# Patient Record
Sex: FEMALE | Race: WHITE | NOT HISPANIC OR LATINO
[De-identification: names, ages, dates, MRNs, and addresses within clinical notes are randomized per-mention and may not be internally consistent; named-entity substitution may affect disease eponyms.]

---

## 2020-06-05 PROBLEM — Z00.00 ENCOUNTER FOR PREVENTIVE HEALTH EXAMINATION: Status: ACTIVE | Noted: 2020-06-05

## 2020-06-11 ENCOUNTER — APPOINTMENT (OUTPATIENT)
Dept: VASCULAR SURGERY | Facility: CLINIC | Age: 45
End: 2020-06-11
Payer: COMMERCIAL

## 2020-06-11 VITALS
BODY MASS INDEX: 17.67 KG/M2 | WEIGHT: 90 LBS | DIASTOLIC BLOOD PRESSURE: 80 MMHG | SYSTOLIC BLOOD PRESSURE: 120 MMHG | HEIGHT: 60 IN

## 2020-06-11 DIAGNOSIS — J45.909 UNSPECIFIED ASTHMA, UNCOMPLICATED: ICD-10-CM

## 2020-06-11 DIAGNOSIS — F17.200 NICOTINE DEPENDENCE, UNSPECIFIED, UNCOMPLICATED: ICD-10-CM

## 2020-06-11 DIAGNOSIS — M79.89 OTHER SPECIFIED SOFT TISSUE DISORDERS: ICD-10-CM

## 2020-06-11 PROCEDURE — 99203 OFFICE O/P NEW LOW 30 MIN: CPT

## 2020-06-11 RX ORDER — ALBUTEROL SULFATE 90 UG/1
INHALANT RESPIRATORY (INHALATION)
Refills: 0 | Status: ACTIVE | COMMUNITY

## 2020-06-11 NOTE — ASSESSMENT
[FreeTextEntry1] : 43 y/o female with h/o lower back disc herniation presents for evaluation of leg swelling bilaterally for the past one year, denies any h/o DVT, had varicose veins removed from right calf several years ago. Swelling worse at the end of the day. She used to be a pharmacist, has used compression stockings with some improvement. \par She does not have significant lower extremity edema or varicosities on physical exam, pulses are palpable throughout the lower extremities bilaterally.\par I recommend compression stockings as tolerated, no vascular surgical intervention is needed at this time.

## 2020-06-11 NOTE — HISTORY OF PRESENT ILLNESS
[FreeTextEntry1] : 45 y/o female with h/o lower back disc herniation presents for evaluation of leg swelling bilaterally for the past one year, denies any h/o DVT, had varicose veins removed from right calf several years ago. Swelling worse at the end of the day.

## 2023-04-27 ENCOUNTER — APPOINTMENT (OUTPATIENT)
Dept: PAIN MANAGEMENT | Facility: CLINIC | Age: 48
End: 2023-04-27
Payer: COMMERCIAL

## 2023-04-27 VITALS — WEIGHT: 90 LBS | BODY MASS INDEX: 17.67 KG/M2 | HEIGHT: 60 IN

## 2023-04-27 DIAGNOSIS — M54.16 RADICULOPATHY, LUMBAR REGION: ICD-10-CM

## 2023-04-27 PROCEDURE — 96136 PSYCL/NRPSYC TST PHY/QHP 1ST: CPT | Mod: 59

## 2023-04-27 PROCEDURE — 99204 OFFICE O/P NEW MOD 45 MIN: CPT | Mod: 25

## 2023-04-27 NOTE — PHYSICAL EXAM
[de-identified] : BACK - tenderness into the lumbar paraspinals. ROM restricted. Pain with flexion. Positive SLR bilaterally.

## 2023-04-27 NOTE — DATA REVIEWED
[FreeTextEntry1] : MRI of the lumbar spine taken on 04/08/2022 showed degenerative disc disease, disc bulge, and borderline stenosis L4-5 and L5-S1.  Left foraminal disc at L4-5 contributing to nerve root impingement of the exiting left-sided nerve root.  Otherwise no significant abnormalities.\par \par SOAPP: Scored a 0 , low risk.\par  \par [place] REGISTRY: Reviewed .  \par  \par UDS: No data obtained today. \par   \par Medications that trigger a UDS: Benzodiazepines (Ativan, Xanax, Valium) etc, Barbiturates, Narcotics (Avinza, Butrans, hydrocodone, Codeine, Estee, Methadone, Morphine, MS Contin, Opana, oxycodone, Oxycontin, Suboxone etc), Pregabalin (Lyrica), Tramadol (Ultacet, Utram etc), Tapentadol, (Nucynta) and Elist Drugs (cocaine, THC, Etc.)\par  \par Risk factors: Bipolar Illness, positive for any an illicit drugs, history of any ETOH and drug abuse, any signs of diversion, Sharing Meds, selling meds. Non consistent [place] drug reporting and above 120meq of morphine\par  \par Low risk: Patient has combination of a low risk SOAP and no risk factors. UDS would be repeated randomly every quarter

## 2023-04-27 NOTE — ASSESSMENT
[FreeTextEntry1] : 47 year old female presenting with ongoing lumbar radiculopathy. Patient is presenting with radicular pain with impairment in ADLs and functionality.  The pain has not responded to conservative care, including medications, stretching, as well as active modalities, such as physical therapy.  Imaging studies as well as physical exam findings corroborate the symptomatology and radicular pain.  We will proceed with an epidural at this point. Follow up in 6 weeks will be made for reassessment. I have explained the findings to the patient and all questions have been answered. \par \par Bilateral L4-5 transforaminal epidural steroid injection with sedation.\par \par Patient had a MRI that shows a radicular component along with pain referred into the lower extremity. Patient has trialed rehab (Home exercise, physical therapy or chiropractic care) and medications I will schedule a L4-5 SNRI. \par \par Risk, benefits, pros and cons of procedure were explained to the patient using models and diagrams and their questions were answered. \par \par \par The patient has severe anxiety of procedures that necessitates monitored anesthesia care (MAC). The procedure performed will be close to major nerves, arteries, and spinal cord and/or joint structures. Due to the proximity of these structures, we need the patient to be still during the procedure.  With the help of MAC, this will be safely achieved and decrease the risk of any complications.\par \par Neuropsychological SOAPP testing was performed as an evaluation of cognition, mood, personality, behavior to assess likelihood of addiction, misuse, and other aberrant medication-related behaviors. The testing quantifies a patient's requirement for monitoring if/when long-term opioid therapy or other controlled substances might be required.  The total time spent rendering and interpreting the service was approximately 20 minutes. Results will be implemented in the appropriate care of the patient\par \par Entered by Rosy Trevino, acting as scribe for Dr. Morgan.\par  \par The documentation recorded by the scribe, in my presence, accurately reflects the service I personally performed, and the decisions made by me with my edits as appropriate.\par  \par Best Regards, \par Arthur Morgan MD \par Board Certified, Anesthesiology \par Board Certified, Pain Medicine\par

## 2023-04-27 NOTE — HISTORY OF PRESENT ILLNESS
[FreeTextEntry1] : HISTORY OF PRESENT ILLNESS: Ms. Felix is a 47 year old female complaining of lower back pain.\par \par She states the pain is in the back and radiates into the lower extremities. She states there is numbness, tingling and spasms. She states that her legs always feel heavy. She gets stiffness in the legs in the entirety of the leg, especially in the calves. She rates the pain at a 8/10 on the pain scale. \par \par The pain started after no inciting event.  The patient has had this pain for 10 days.  Patient describes the pain as moderate to severe.  During the last month the pain has been nearly constant with symptoms worsening morning. Pain is associated with numbness/pins and needles into the lower extremity.  Patient has weakness in the lower extremities.  Bowel or bladder habits have not changed.\par  \par ACTIVITIES: The patient sometimes lies down because of pain.  Patient uses no inciting event at this time.  \par \par PRIOR PAIN TREATMENTS:  Moderate relief with injection.\par \par Prior Pain Medications:  None mentioned.\par

## 2023-05-11 ENCOUNTER — TRANSCRIPTION ENCOUNTER (OUTPATIENT)
Age: 48
End: 2023-05-11

## 2023-05-15 ENCOUNTER — APPOINTMENT (OUTPATIENT)
Dept: PAIN MANAGEMENT | Facility: CLINIC | Age: 48
End: 2023-05-15
Payer: COMMERCIAL

## 2023-05-15 ENCOUNTER — APPOINTMENT (OUTPATIENT)
Dept: PAIN MANAGEMENT | Facility: CLINIC | Age: 48
End: 2023-05-15

## 2023-05-15 PROCEDURE — 93040 RHYTHM ECG WITH REPORT: CPT | Mod: 59

## 2023-05-15 PROCEDURE — 72100 X-RAY EXAM L-S SPINE 2/3 VWS: CPT

## 2023-05-15 PROCEDURE — 93770 DETERMINATION VENOUS PRESS: CPT | Mod: 59

## 2023-05-15 PROCEDURE — 94761 N-INVAS EAR/PLS OXIMETRY MLT: CPT

## 2023-05-15 PROCEDURE — 64483 NJX AA&/STRD TFRM EPI L/S 1: CPT | Mod: RT

## 2023-05-15 NOTE — PROCEDURE
[FreeTextEntry1] : SELECTIVE TRANSFORAMINAL LUMBAR L4-5 EPIDURAL NERVE ROOT INJECTION UNDER FLUOROSCOPY   [FreeTextEntry3] : Date:  05/15/2023\par \par Patient: Sherry Hunter\par \par :  10/10/1964\par \par \par \par \par Preoperative Diagnosis: Lumbar Radiculopathy\par \par \par \par Procedure:\par 1. Selective Bilateral L4-5 Transforaminal Lumbar Epidural Nerve Root Injection under Fluoroscopy\par 2. Epidurography\par 3. Fluoroscopic guidance and localization of needle\par \par \par \par Physician: Arthur Morgan M.D.\par Anesthesia:  See nurses notes, Local\par \par Medical Necessity:  Failure of conservative management.\par Consent:  Though unusual, the possible complications including infection, bleeding, nerve damage, hospital admission, stroke, pneumothorax, death or failure of the procedure are theoretically possible. The patient was educated about the of the procedure and alternative therapies. All questions were answered and the patient freely gave consent to proceed.\par Indication for Fluoroscopy:  This procedure requires the precise placement of the spinal needle into the epidural space.  It is the only way to accurately and safely perform the injection.\par \par \par \par PROCEDURE NOTE:\par After obtaining written consent, the patient was then positioned on the fluoroscopy table in the prone position with a pillow beneath the pelvis to reduce lumbar lordosis. The lumbar area was prepped with betadine solution and draped in the usual manner. A time out was performed. The fluoroscope was used to identify the L3///L4///L5 vertebral body on the AP projection. It was then rotated into an oblique projection until the superior articulating process of the L5 (inferior) vertebra is projected beneath the 6 o'clock position of the L4 (superior) vertebrae. The 22 gauge 3-1/2 inch needle was inserted in the skin at a point overlying the superior articulating process of the inferior vertebra and aimed for the 6 o'clock position of the superior vertebrae's pedicle.  After the needle contacted bone, a lateral projection was obtained to insure that the needle tip was in proximity with the vertebral body. Paresthesias were not noted.  One ml of Omnipaque 240 was injected and a neurogram was obtained. Following demonstration of the neurogram, 1 ml of Preservative free normal saline and 1 ml of dexamethasone (10mg) was injected. The small volume and relatively high concentration was chosen to preserve selectivity and diagnostic value of the injection. There was no CSF nor heme identified. The contralateral side was injected in identical fashion.\par \par \par Epidurogram: The nerve root was observed in its outline on the neurogram. Distal and proximal spread was noted.\par Findings: Lumbar Spine AP and oblique views with x-ray degenerative changes noted.\par \par Complications: none. \par \par Disposition: I have examined the patient and there are no new physical findings since original presentation. The patient was discharged home with a . The discharge instruction sheet was given to the patient. Motor function was intact.\par \par Comment: 1st TFESI today, depending on effectiveness would schedule 2nd TFESI in 1-2 weeks vs caudal epidural steroid vs follow up in office. Call if any problems\par \par \par \par \par Arthur Morgan MD \par Board Certified, Anesthesiology \par Board Certified, Pain Medicine \par \par

## 2023-05-22 ENCOUNTER — APPOINTMENT (OUTPATIENT)
Dept: PAIN MANAGEMENT | Facility: CLINIC | Age: 48
End: 2023-05-22